# Patient Record
Sex: MALE | Race: WHITE | Employment: OTHER | URBAN - METROPOLITAN AREA
[De-identification: names, ages, dates, MRNs, and addresses within clinical notes are randomized per-mention and may not be internally consistent; named-entity substitution may affect disease eponyms.]

---

## 2018-11-08 ENCOUNTER — HOSPITAL ENCOUNTER (OUTPATIENT)
Dept: PREADMISSION TESTING | Age: 73
Discharge: HOME | End: 2018-11-08
Attending: UROLOGY
Payer: MEDICARE

## 2018-11-08 LAB
ANION GAP SERPL CALC-SCNC: 7 MMOL/L (ref 3–18)
BUN SERPL-MCNC: 22 MG/DL (ref 7–18)
BUN/CREAT SERPL: 19
CALCIUM SERPL-MCNC: 8.7 MG/DL (ref 8.5–10.1)
CHLORIDE SERPL-SCNC: 106 MMOL/L (ref 100–108)
CO2 SERPL-SCNC: 27 MMOL/L (ref 21–32)
CREAT SERPL-MCNC: 1.14 MG/DL (ref 0.6–1.3)
GLUCOSE SERPL-MCNC: 120 MG/DL (ref 74–99)
HCT VFR BLD AUTO: 42.7 % (ref 36–48)
HGB BLD-MCNC: 13.9 G/DL (ref 13–16)
POTASSIUM SERPL-SCNC: 4.2 MMOL/L (ref 3.5–5.5)
SODIUM SERPL-SCNC: 140 MMOL/L (ref 136–145)

## 2018-11-08 PROCEDURE — 36415 COLL VENOUS BLD VENIPUNCTURE: CPT

## 2018-11-08 PROCEDURE — 85018 HEMOGLOBIN: CPT

## 2018-11-08 PROCEDURE — 80048 BASIC METABOLIC PNL TOTAL CA: CPT

## 2018-11-12 PROBLEM — N21.0 BLADDER STONES: Status: ACTIVE | Noted: 2018-11-12

## 2018-11-13 ENCOUNTER — ANESTHESIA EVENT (OUTPATIENT)
Dept: SURGERY | Age: 73
End: 2018-11-13
Payer: MEDICARE

## 2018-11-13 ENCOUNTER — ANESTHESIA (OUTPATIENT)
Dept: SURGERY | Age: 73
End: 2018-11-13
Payer: MEDICARE

## 2018-11-13 ENCOUNTER — HOSPITAL ENCOUNTER (OUTPATIENT)
Age: 73
Setting detail: OUTPATIENT SURGERY
Discharge: HOME | End: 2018-11-13
Attending: UROLOGY | Admitting: UROLOGY
Payer: MEDICARE

## 2018-11-13 VITALS
SYSTOLIC BLOOD PRESSURE: 133 MMHG | BODY MASS INDEX: 33.41 KG/M2 | WEIGHT: 200.56 LBS | DIASTOLIC BLOOD PRESSURE: 81 MMHG | HEIGHT: 65 IN | OXYGEN SATURATION: 97 % | RESPIRATION RATE: 10 BRPM | TEMPERATURE: 97.7 F | HEART RATE: 74 BPM

## 2018-11-13 DIAGNOSIS — N21.0 BLADDER STONES: Primary | ICD-10-CM

## 2018-11-13 LAB — GLUCOSE BLD STRIP.AUTO-MCNC: 118 MG/DL (ref 70–110)

## 2018-11-13 PROCEDURE — 74011250636 HC RX REV CODE- 250/636

## 2018-11-13 PROCEDURE — 77030012508 HC MSK AIRWY LMA AMBU -A: Performed by: NURSE ANESTHETIST, CERTIFIED REGISTERED

## 2018-11-13 PROCEDURE — 74011250636 HC RX REV CODE- 250/636: Performed by: UROLOGY

## 2018-11-13 PROCEDURE — 82360 CALCULUS ASSAY QUANT: CPT

## 2018-11-13 PROCEDURE — 76010000138 HC OR TIME 0.5 TO 1 HR: Performed by: UROLOGY

## 2018-11-13 PROCEDURE — 76060000032 HC ANESTHESIA 0.5 TO 1 HR: Performed by: UROLOGY

## 2018-11-13 PROCEDURE — 82962 GLUCOSE BLOOD TEST: CPT

## 2018-11-13 PROCEDURE — 76210000021 HC REC RM PH II 0.5 TO 1 HR: Performed by: UROLOGY

## 2018-11-13 PROCEDURE — 77030020782 HC GWN BAIR PAWS FLX 3M -B: Performed by: UROLOGY

## 2018-11-13 PROCEDURE — C1769 GUIDE WIRE: HCPCS | Performed by: UROLOGY

## 2018-11-13 PROCEDURE — 77030032490 HC SLV COMPR SCD KNE COVD -B: Performed by: UROLOGY

## 2018-11-13 PROCEDURE — 76210000006 HC OR PH I REC 0.5 TO 1 HR: Performed by: UROLOGY

## 2018-11-13 PROCEDURE — 74011000250 HC RX REV CODE- 250

## 2018-11-13 PROCEDURE — 77030005518 HC CATH URETH FOL 2W BARD -B: Performed by: UROLOGY

## 2018-11-13 PROCEDURE — 77030013079 HC BLNKT BAIR HGGR 3M -A: Performed by: NURSE ANESTHETIST, CERTIFIED REGISTERED

## 2018-11-13 PROCEDURE — 77030018832 HC SOL IRR H20 ICUM -A: Performed by: UROLOGY

## 2018-11-13 PROCEDURE — 77030018836 HC SOL IRR NACL ICUM -A: Performed by: UROLOGY

## 2018-11-13 RX ORDER — SODIUM CHLORIDE, SODIUM LACTATE, POTASSIUM CHLORIDE, CALCIUM CHLORIDE 600; 310; 30; 20 MG/100ML; MG/100ML; MG/100ML; MG/100ML
100 INJECTION, SOLUTION INTRAVENOUS CONTINUOUS
Status: DISCONTINUED | OUTPATIENT
Start: 2018-11-13 | End: 2018-11-13 | Stop reason: HOSPADM

## 2018-11-13 RX ORDER — LIDOCAINE HYDROCHLORIDE 20 MG/ML
INJECTION, SOLUTION EPIDURAL; INFILTRATION; INTRACAUDAL; PERINEURAL AS NEEDED
Status: DISCONTINUED | OUTPATIENT
Start: 2018-11-13 | End: 2018-11-13 | Stop reason: HOSPADM

## 2018-11-13 RX ORDER — PROPOFOL 10 MG/ML
INJECTION, EMULSION INTRAVENOUS AS NEEDED
Status: DISCONTINUED | OUTPATIENT
Start: 2018-11-13 | End: 2018-11-13 | Stop reason: HOSPADM

## 2018-11-13 RX ORDER — FLUMAZENIL 0.1 MG/ML
0.2 INJECTION INTRAVENOUS
Status: DISCONTINUED | OUTPATIENT
Start: 2018-11-13 | End: 2018-11-13 | Stop reason: HOSPADM

## 2018-11-13 RX ORDER — EPHEDRINE SULFATE/0.9% NACL/PF 25 MG/5 ML
SYRINGE (ML) INTRAVENOUS AS NEEDED
Status: DISCONTINUED | OUTPATIENT
Start: 2018-11-13 | End: 2018-11-13 | Stop reason: HOSPADM

## 2018-11-13 RX ORDER — SODIUM CHLORIDE, SODIUM LACTATE, POTASSIUM CHLORIDE, CALCIUM CHLORIDE 600; 310; 30; 20 MG/100ML; MG/100ML; MG/100ML; MG/100ML
125 INJECTION, SOLUTION INTRAVENOUS CONTINUOUS
Status: DISCONTINUED | OUTPATIENT
Start: 2018-11-13 | End: 2018-11-13 | Stop reason: HOSPADM

## 2018-11-13 RX ORDER — TRAMADOL HYDROCHLORIDE 50 MG/1
50 TABLET ORAL
Qty: 20 TAB | Refills: 0 | Status: SHIPPED | OUTPATIENT
Start: 2018-11-13

## 2018-11-13 RX ORDER — DEXAMETHASONE SODIUM PHOSPHATE 4 MG/ML
INJECTION, SOLUTION INTRA-ARTICULAR; INTRALESIONAL; INTRAMUSCULAR; INTRAVENOUS; SOFT TISSUE AS NEEDED
Status: DISCONTINUED | OUTPATIENT
Start: 2018-11-13 | End: 2018-11-13 | Stop reason: HOSPADM

## 2018-11-13 RX ORDER — MIDAZOLAM HYDROCHLORIDE 1 MG/ML
INJECTION, SOLUTION INTRAMUSCULAR; INTRAVENOUS AS NEEDED
Status: DISCONTINUED | OUTPATIENT
Start: 2018-11-13 | End: 2018-11-13 | Stop reason: HOSPADM

## 2018-11-13 RX ORDER — CEFAZOLIN SODIUM/WATER 2 G/20 ML
2 SYRINGE (ML) INTRAVENOUS ONCE
Status: COMPLETED | OUTPATIENT
Start: 2018-11-13 | End: 2018-11-13

## 2018-11-13 RX ORDER — ONDANSETRON 2 MG/ML
INJECTION INTRAMUSCULAR; INTRAVENOUS AS NEEDED
Status: DISCONTINUED | OUTPATIENT
Start: 2018-11-13 | End: 2018-11-13 | Stop reason: HOSPADM

## 2018-11-13 RX ORDER — FENTANYL CITRATE 50 UG/ML
INJECTION, SOLUTION INTRAMUSCULAR; INTRAVENOUS AS NEEDED
Status: DISCONTINUED | OUTPATIENT
Start: 2018-11-13 | End: 2018-11-13 | Stop reason: HOSPADM

## 2018-11-13 RX ORDER — CEPHALEXIN 250 MG/1
500 CAPSULE ORAL 2 TIMES DAILY
Qty: 15 CAP | Refills: 0 | Status: SHIPPED | OUTPATIENT
Start: 2018-11-13 | End: 2018-11-20

## 2018-11-13 RX ORDER — NALOXONE HYDROCHLORIDE 0.4 MG/ML
0.4 INJECTION, SOLUTION INTRAMUSCULAR; INTRAVENOUS; SUBCUTANEOUS AS NEEDED
Status: DISCONTINUED | OUTPATIENT
Start: 2018-11-13 | End: 2018-11-13 | Stop reason: HOSPADM

## 2018-11-13 RX ORDER — GLYCOPYRROLATE 0.2 MG/ML
INJECTION INTRAMUSCULAR; INTRAVENOUS AS NEEDED
Status: DISCONTINUED | OUTPATIENT
Start: 2018-11-13 | End: 2018-11-13 | Stop reason: HOSPADM

## 2018-11-13 RX ORDER — FENTANYL CITRATE 50 UG/ML
50 INJECTION, SOLUTION INTRAMUSCULAR; INTRAVENOUS
Status: DISCONTINUED | OUTPATIENT
Start: 2018-11-13 | End: 2018-11-13 | Stop reason: HOSPADM

## 2018-11-13 RX ADMIN — MIDAZOLAM HYDROCHLORIDE 2 MG: 1 INJECTION, SOLUTION INTRAMUSCULAR; INTRAVENOUS at 14:18

## 2018-11-13 RX ADMIN — SODIUM CHLORIDE, SODIUM LACTATE, POTASSIUM CHLORIDE, AND CALCIUM CHLORIDE: 600; 310; 30; 20 INJECTION, SOLUTION INTRAVENOUS at 14:15

## 2018-11-13 RX ADMIN — SODIUM CHLORIDE, SODIUM LACTATE, POTASSIUM CHLORIDE, AND CALCIUM CHLORIDE: 600; 310; 30; 20 INJECTION, SOLUTION INTRAVENOUS at 14:45

## 2018-11-13 RX ADMIN — LIDOCAINE HYDROCHLORIDE 100 MG: 20 INJECTION, SOLUTION EPIDURAL; INFILTRATION; INTRACAUDAL; PERINEURAL at 14:24

## 2018-11-13 RX ADMIN — GLYCOPYRROLATE 0.2 MG: 0.2 INJECTION INTRAMUSCULAR; INTRAVENOUS at 14:18

## 2018-11-13 RX ADMIN — FENTANYL CITRATE 100 MCG: 50 INJECTION, SOLUTION INTRAMUSCULAR; INTRAVENOUS at 14:18

## 2018-11-13 RX ADMIN — SODIUM CHLORIDE, SODIUM LACTATE, POTASSIUM CHLORIDE, AND CALCIUM CHLORIDE 125 ML/HR: 600; 310; 30; 20 INJECTION, SOLUTION INTRAVENOUS at 15:19

## 2018-11-13 RX ADMIN — Medication 10 MG: at 14:51

## 2018-11-13 RX ADMIN — ONDANSETRON 4 MG: 2 INJECTION INTRAMUSCULAR; INTRAVENOUS at 14:18

## 2018-11-13 RX ADMIN — Medication 2 G: at 14:18

## 2018-11-13 RX ADMIN — Medication 10 MG: at 14:49

## 2018-11-13 RX ADMIN — DEXAMETHASONE SODIUM PHOSPHATE 4 MG: 4 INJECTION, SOLUTION INTRA-ARTICULAR; INTRALESIONAL; INTRAMUSCULAR; INTRAVENOUS; SOFT TISSUE at 14:18

## 2018-11-13 RX ADMIN — PROPOFOL 200 MG: 10 INJECTION, EMULSION INTRAVENOUS at 14:24

## 2018-11-13 NOTE — ANESTHESIA PREPROCEDURE EVALUATION
Anesthetic History Pertinent negatives: No increased risk of difficult airway, PONV, pseudocholinesterase deficiency, malignant hyperthermia and history of awareness of surgery under anesthesia Comments: Coded during heart ablation requiring ACLS Review of Systems / Medical History Patient summary reviewed, nursing notes reviewed and pertinent labs reviewed Pulmonary Sleep apnea: No treatment Pertinent negatives: No COPD, asthma and recent URI Comments: Former smoker Neuro/Psych CVA: no residual symptoms TIA Pertinent negatives: No seizures and neuromuscular disease Cardiovascular Hypertension: well controlled Dysrhythmias : atrial fibrillation Past MI and CAD Exercise tolerance: >4 METS Comments: Paroxysmal afib GI/Hepatic/Renal 
  
GERD: well controlled Renal disease: stones Pertinent negatives: No hepatitis and liver disease Endo/Other Diabetes Obesity and arthritis Pertinent negatives: No hypothyroidism, hyperthyroidism, morbid obesity and blood dyscrasia Other Findings Comments: Diet controlled DM Physical Exam 
 
Airway Mallampati: III 
TM Distance: 4 - 6 cm Neck ROM: decreased range of motion Mouth opening: Normal 
 
 Cardiovascular Regular rate and rhythm,  S1 and S2 normal,  no murmur, click, rub, or gallop Dental 
 
Dentition: Maria Guadalupe Woodward Comments: Missing many upper/lower teeth Pulmonary Breath sounds clear to auscultation Abdominal 
GI exam deferred Other Findings Anesthetic Plan ASA: 3 Anesthesia type: general 
 
 
 
 
Induction: Intravenous Anesthetic plan and risks discussed with: Patient GA/LMA

## 2018-11-13 NOTE — H&P
Urology [facility] [address]  [address] Tel: [phone] Fax: [phone] Patient: Armando Corona YOB: 1945 Assessment/Plan # Detail Type Description 1. Assessment Gross hematuria (R31.0), Symptomatic. Patient Plan His hematuria is improved following his antibiotics course but he does have bladder stones and this is likely the cause of his bleeding. 2. Assessment Urinary bladder stone (N21.0). Patient Plan He has 2 bladder stones whose total aggregate is about 2cm in size. We will do a cysto litholapaxy. Risk of bleeding, infection, injury, pain were discussed. 3. Assessment Other urethral stricture, male, meatal (N35.811). Patient Plan He had a moderate bulbar urethral stricture but I was able to pass my scope through. I will likely need to do a dilation at the time of his litholapaxy. Risk of bleeding, infection and restenosis were discussed. 4. Assessment Dysuria (R30.0). Patient Plan He had a recent UTI and this is resolved with tx.  
    
 5. Assessment Poor urinary stream (R39.12). Patient Plan This is likely due to his stricture and his bladder stones. 6. Assessment Personal history of malignant neoplasm of prostate (Z85.46). Patient Plan He's had a fine PSA recheck next visit. This 75year old male presents for Prostate Cancer, Hematuria, BPH and Erectile Dysfunction. History of Present Illness: 1. Prostate Cancer The patient is here today for scheduled follow up. The patient's status is without evidence of disease. He was diagnosed on 04/11/2013. He has had the following treatment: radiation therapy (proton beam on 08/19/2013 with outcome of no evidence of disease).  The patient is experiencing hematuria, nocturia, slow stream, urinary dribbling, urinary incontinence, urinary straining and urinary urgency but denies chills, diarrhea, a fever, headache, nausea, sexual dysfunction or vomiting. Pertinent history does not include a family history of prostate cancer or a diet high in animal fat. Additional information: PSA = 1.85 9/2010     2.24 (4/2012)   3.09 (9/2012)   4.04 (3/2013) Path 4/11/13 = positive for prostate cancer He underwent proton therapy and is doing well. Post treatment PSA = 1.29 (11/2013)  0.9 (5/2014)   0.79 (3/2015)  0.34 (2/2016)    0.28 (8/2016)    0.31 (9/2017)    0.439 (9/2018). 2.  Hematuria The patient presents with hematuria (gross). The problem began 3 years ago. The symptoms are rare. The problem has improved. Pertinent history includes being at least 35years of age but not history of UTIs or prior prostate surgeries. He also complains of incomplete emptying, nocturia, secondhand smoke, slow stream, urinary dribbling, urinary incontinence, straining to urinate and urgency. He denies chills, fever, nausea and vomiting. Additional information: He had a PVP in 2015 and has had persistent hematuria ever since. it only occurs with straining. He does take Pradaxa and this is unchanged and things are quite rare. .   
 
 9/2018 - He kal has mild gross hematuria after heavy lifting only. It is actually better than the past.  He still refuses cystoscopy 10/10/18 - He has intermittnet hematuria and now dysuria and frequency. Things are worse. 10/18/18 - He was treated for an e coli UTI and is much better. The hematuria is improved, but still happens with straining. Th e dysuria is resolved though 3. BPH Onset was gradual. Severity level is moderate. It occurs daily. The problem is with no change. Associated symptoms include hematuria (gross), incomplete emptying, nocturia (4 times per night), pelvic pressure, slow stream, urgency, urinary incontinence (urgency), dribbling and urinary straining. Pertinent negatives include chills, constipation and fever. Additional information:  He is s/p PVP in 2015 and has persistent hematuria while on Pradaxa and really only occurs after straining. He was found to have a bladder stone but he had heart issues and never had surgery. PVR=75ml (9/20/17) PVR = 0ml (9/1/16)         10/11/2018--He is doing worse with urgency, frequency, dysuria and intermittent hematuria. Frequency is Q10 min. Pain at meatus. 10/19/18 -- He is doing much better and the dysuria is beter following BacrimDS. No new urgency. still with hematuria. 4.  Erectile Dysfunction The symptoms began 4 years ago, have been severe and are unchanged. The patient is here today for a follow up visit. The patient states he does have difficultly attaining an erection and has difficulty maintaining an erection. The adequacy of his erection measures 40.00% rigidity. Pertinent history includes hypertension but not diabetes, hyperlipidemia or neurologic disease. The patient is . He denies depression. Additional information: He has persistent ED post prostate cancer treatment  He has failed oral therapies. He is not interested in new treatment at this time. PAST MEDICAL/SURGICAL HISTORY   (Reviewed, updated) Disease/disorder Onset Date Management Date Comments Green light PVP 12/02/2014 CHOLECYSTITIS  LAP CNYDY 06/18/2014 Diabetes Hypertension      
  bone spur right and left shoulder removal    
  Appendectomy PROBLEM LIST:   Problem List reviewed. Problem Description Onset Date Chronic Clinical Status Notes Syncope and collapse 02/22/2011 Y Primary malignant neoplasm of prostate 05/22/2013 Y Raised prostate specific antigen 03/18/2013 Y Tachycardia 02/22/2011 Y Benign essential hypertension 02/22/2011 Y Mixed hyperlipidemia 02/22/2011 Y Gastroesophageal reflux disease 02/22/2011 Y Arthropathy 02/22/2011 Y Impaired fasting glycaemia 02/22/2011 Y    
 Diverticular disease of colon 02/22/2011 Y Chronic obstructive lung disease 06/26/2013 Y Coronary atherosclerosis 06/26/2013 Y Atrial fibrillation 09/28/2011 Y Medications (active prior to today) Medication Instructions Start Date Stop Date Refilled Elsewhere  
aspirin 81 mg tablet,delayed release take 1 tablet (81MG)  by ORAL route  every day //  06/22/2016 Y  
losartan 50 mg tablet take 1 tablet by oral route  every day 07/05/2016 07/05/2016 N Zetia 10 mg tablet take 1 tablet (10MG)  by ORAL route  every day 07/05/2016 07/05/2016 N Vitamin D3 2,000 unit capsule once daily 09/26/2016   N  
silver sulfadiazine 1 % topical cream apply by topical route 3 times every day a 1/16 inch (1.5 mm) thick layer to entire burn area 04/10/2017   N Voltaren 1 % topical gel apply (2G)  by topical route 3 times every day to the affected area(s) 05/23/2018   N Lipitor 80 mg tablet TAKE 1 TABLET DAILY 05/31/2018 05/31/2018 N  
PRADAXA CAPS 60'S 150MG TAKE 1 CAPSULE TWICE A DAY 06/28/2018 06/28/2018 N  
PANTOPRAZOLE 40 MG TABLET,DELAYED RELEASE TAKE 1 TABLET BY ORAL ROUTE  EVERY DAY 07/25/2018 07/25/2018 N  
MULTAQ TABS 400MG TAKE 1 TABLET TWICE A DAY WITH MORNING AND EVENING MEALS 08/13/2018 08/13/2018 N  
nitroglycerin  //   Y  
prednisone 2.5 mg tablet take 1 tablet by oral route  every day 09/06/2018 09/06/2018 N  
FENOFIBRATE TABS 160MG TAKE 1 TABLET DAILY 09/18/2018 09/18/2018 N Bactrim  mg-160 mg tablet take 1 tablet by oral route  every 12 hours 10/11/2018   N Medication Reconciliation Medications reconciled today. Medication Reviewed Adherence Medication Name Sig Desc Elsewhere Status  
taking as directed aspirin 81 mg tablet,delayed release take 1 tablet (81MG)  by ORAL route  every day Y Verified  
taking as directed losartan 50 mg tablet take 1 tablet by oral route  every day N Verified  
taking as directed Zetia 10 mg tablet take 1 tablet (10MG)  by ORAL route every day N Verified  
taking as directed Vitamin D3 2,000 unit capsule once daily N Verified  
taking as directed silver sulfadiazine 1 % topical cream apply by topical route 3 times every day a 1/16 inch (1.5 mm) thick layer to entire burn area N Verified  
taking as directed Voltaren 1 % topical gel apply (2G)  by topical route 3 times every day to the affected area(s) N Verified  
taking as directed Lipitor 80 mg tablet TAKE 1 TABLET DAILY N Verified  
taking as directed PRADAXA CAPS 60'S 150MG TAKE 1 CAPSULE TWICE A DAY N Verified  
taking as directed PANTOPRAZOLE 40 MG TABLET,DELAYED RELEASE TAKE 1 TABLET BY ORAL ROUTE  EVERY DAY N Verified  
taking as directed MULTAQ TABS 400MG TAKE 1 TABLET TWICE A DAY WITH MORNING AND EVENING MEALS N Verified  
taking as directed nitroglycerin  Y Verified  
taking as directed prednisone 2.5 mg tablet take 1 tablet by oral route  every day N Verified  
taking as directed FENOFIBRATE TABS 160MG TAKE 1 TABLET DAILY N Verified  
taking as directed Bactrim  mg-160 mg tablet take 1 tablet by oral route  every 12 hours N Verified Allergies Ingredient Reaction (Severity) Medication Name Comment NO KNOWN ALLERGIES Reviewed, no changes. Family History  (Reviewed, updated) Relationship Family Member Name  Age at Death Condition Onset Age Cause of Death Family history of Heart disease  N Family history of heart attacks  N Family history of Seizure disorder  N Family history of Diabetes mellitus  N Family history of Bleeding tendencies  N Family history of Hypertension  N Brother      N Brother  [employer] and well  Jennifer Hastings Brother  N  Cancer, prostate 78 N Father      N Mother      N Sister  [employer] and well  N Sister      N Social History:  (Reviewed, updated) Tobacco use reviewed. Preferred language is [place].    
MARITAL STATUS/FAMILY/SOCIAL SUPPORT 
 Currently . Smoking status: Never smoker. SMOKING STATUS Use Status Type Smoking Status Usage Per Day Years Used Total Pack Years  
no/never  Never smoker TOBACCO/VAPING EXPOSURE There is passive smoke exposure. ALCOHOL There is a history of alcohol use. Type: Hard liquor. 1 1/2 shots consumed daily. CAFFEINE The patient uses caffeine: coffee - 5 cups a day. LIFESTYLE 
Moderate activity level. Exercises 2-3 times a week. Review of Systems System Neg/Pos Details Constitutional Negative Chills and Fever. ENMT Negative Ear infections and Sore throat. Eyes Negative Blurred vision, Double vision and Eye pain. Respiratory Negative Asthma, Chronic cough, Dyspnea and Wheezing. Cardio Negative Chest pain. GI Negative Constipation, Decreased appetite, Diarrhea, Nausea and Vomiting.  Positive Hematuria, Incomplete emptying, Nocturia, Pelvic pressure, Slow stream, Urgency, Urinary dribbling, Urinary incontinence, Urinary straining. Endocrine Negative Cold intolerance, Heat intolerance, Increased thirst and Weight loss. Neuro Negative Headache and Tremors. Psych Negative Anxiety and Depression. Integumentary Negative Itching skin and Rash. MS Negative Back pain and Joint pain. Hema/Lymph Negative Easy bleeding. Reproductive Negative Sexual dysfunction. Physical Exam 
Exam Findings Details Constitutional Normal Well developed. Neck Exam Normal Inspection - Normal.  
Respiratory Normal Inspection - Normal.  
Abdomen Normal No abdominal tenderness. Genitourinary Normal Penis - Normal. Urethral meatus - Normal. Scrotum - Normal. Epididymides - Normal. Lymph nodes - Normal. Testes - Normal. No CVA tenderness. No suprapubic tenderness. Extremity Normal No edema. Psychiatric Normal Orientation - Oriented to time, place, person & situation. Appropriate mood and affect. Procedures: 
 
Cystoscopy indication: 
Bladder. Patient consent: Consent was obtained. The procedure and risks were explained in detail. Questions were encouraged and answered. The patient was prepped and draped in the usual sterile fashion. Procedure: A diagnostic cystourethroscopy was performed using a 16 Bengali flexible cystoscope Anesthesia: 
Lidocaine Jelly 2% Patient position: 
Supine. Patient response: 
Patient tolerated procedure well. Patient was given instructions. Patient was discharged in stable condition. Findings: Anterior urethra normal in appearance. Prostatic urethra post TURP. The bladder has a stone that is 20mm, there are multiple stones. Ureteral orifices normal in appearance. Antibiotics: Antibiotics given:  Cipro Impression: 
Gross hematuria R31.0. Patient Education # Patient Education 1. Cystoscopy: Care Instructions Medications (added, continued, or stopped today) Start Date Medication Directions PRN Status PRN Reason Instruction Stop Date  
 aspirin 81 mg tablet,delayed release take 1 tablet (81MG)  by ORAL route  every day N     
10/11/2018 Bactrim  mg-160 mg tablet take 1 tablet by oral route  every 12 hours N     
09/18/2018 FENOFIBRATE TABS 160MG TAKE 1 TABLET DAILY N     
05/31/2018 Lipitor 80 mg tablet TAKE 1 TABLET DAILY N     
07/05/2016 losartan 50 mg tablet take 1 tablet by oral route  every day N     
08/13/2018 MULTAQ TABS 400MG TAKE 1 TABLET TWICE A DAY WITH MORNING AND EVENING MEALS N     
 nitroglycerin  N     
07/25/2018 PANTOPRAZOLE 40 MG TABLET,DELAYED RELEASE TAKE 1 TABLET BY ORAL ROUTE  EVERY DAY N     
06/28/2018 PRADAXA CAPS 60'S 150MG TAKE 1 CAPSULE TWICE A DAY N     
09/06/2018 prednisone 2.5 mg tablet take 1 tablet by oral route  every day N  start after steroid taper 04/10/2017 silver sulfadiazine 1 % topical cream apply by topical route 3 times every day a 1/16 inch (1.5 mm) thick layer to entire burn area N     
09/26/2016 Vitamin D3 2,000 unit capsule once daily N     
 05/23/2018 Voltaren 1 % topical gel apply (2G)  by topical route 3 times every day to the affected area(s) N     
07/05/2016 Zetia 10 mg tablet take 1 tablet (10MG)  by ORAL route  every day N Active Patient Care Team Members Name Contact Agency Type Support Role Relationship Active Date Inactive Date Specialty Jeimy Earl   Patient provider PCP   Family Practice Jenae Núñez   specialist      
Les Cerda   encounter provider    Urology Provider:  
 
 
Rigoberto Thomson MD

## 2018-11-13 NOTE — BRIEF OP NOTE
BRIEF OPERATIVE NOTE Date of Procedure: 11/13/2018 Preoperative Diagnosis: BLADDER STONES Postoperative Diagnosis: BLADDER STONES, POST-OPERATIVE URETHRAL STRICTURE Procedure(s): 
CYSTOSCOPY, CYSTOLITHALOPAXY- SMALL, URETHRAL DILATION Surgeon(s) and Role: 
   * Mandeep Reece MD - Primary Surgical Assistant: NONE Surgical Staff: 
Circ-1: Reba Becerra RN Scrub Tech-1: Dayna Moore Scrub RN-1: Cleo Jimenez Event Time In Time Out Incision Start 21  Incision Close 1506 Anesthesia: General  
Estimated Blood Loss: MINIMAL Specimens:  
ID Type Source Tests Collected by Time Destination 1 : bladder stones Preservative Bladder  Mandeep Reece MD 11/13/2018 1456 Pathology Findings: TIGHT AND DENSE BULBAR URETHRAL STRICTURE,  2 CM BLADDER STONE Complications: NONE Implants: 22 fR [place] TIP LÓPEZ

## 2018-11-13 NOTE — OP NOTES
[facility] [place]  OPERATIVE REPORT    Radha Mullen  MR#: [id number]  : 1945  ACCOUNT #: [de-identified]   DATE OF SERVICE: 2018    PREOPERATIVE DIAGNOSIS:  Bladder stones. POSTOPERATIVE DIAGNOSIS:  Bladder stones and postoperative urethral stricture. PROCEDURES PERFORMED:  Cystoscopy, cystolitholapaxy (small, less than 2.5 cm), and a urethral dilation. SURGEON:  Yue Swann MD    ASSISTANT:  None. ANESTHESIA:  General.    ESTIMATED BLOOD LOSS:  Minimal.    SPECIMENS REMOVED:  Bladder stone. FINDINGS:  The patient had tight dense bulbar urethral stricture. There was a 2 cm bladder stone. COMPLICATIONS:  None. IMPLANT:  A 22-[place]orean [place]. CLINICAL NOTE:  The patient is a 50-year-old gentleman with a history of prostate cancer who underwent ablative therapy and he had a lot of urinary issues for which he was treated with a TURP. He has had declining urinary symptoms with slowing of his stream, burning, difficulties voiding and gross hematuria. He was found to have a bladder stone. He presents for treatment. OPERATIVE REPORT:  Preoperatively, risks and benefits of surgery were described to the patient. The risks include but are not limited to bleeding, infection, injury to bladder, injury to the urethra, possible need for future procedures. The patient understood the risks and signed the informed consent. The patient was taken to the operating room and placed on OR table in supine position. He was administered general anesthetic. He was administered intravenous antibiotics. He was placed in dorsal lithotomy position, prepped and draped in the usual sterile manner. A 21-Korean cystoscope and sheath was then inserted transurethrally atraumatically under direct vision using a 30 degree lens. Once I got all the way down to the bulbar urethra, I encountered a very dense and blanched stricture.   I was able to feed a sensor wire through the stricture and into his bladder. I removed the scope. Over the Sensor wire, I passed Shanice sounds dilating him up from 12 [place] up to 30-Malagasy sequentially. The last dilator was removed and I passed the cystoscope transurethrally into the prostatic urethra and into the bladder. In the prostatic urethra. I encountered some stone that was 2 cm in size. The panendoscopy was done of the bladder. There were no stones, no tumors, no areas of concern in the bladder, it was a stone adherent to the wall of the prostatic urethra. Using a 500 mcg holmium laser, I broke up the stone into many small fragments until it was completely disconnected from prostatic wall. At this point, the stone fragments were irrigated out of the bladder just with passive filling and emptying through the cystoscope. The scope was then reinserted back through the sheath and the bladder was inspected and unharmed and normal.  Bilateral ureteral orifices were inspected and unharmed and normal and the prostatic urethra had minimal bleeding. There was no additional stone fragments identified. At this point, the scope was removed after passing a sensor wire through the scope into the bladder. The bladder was then emptied with a 22-Malagasy [place] tip catheter placed over the wire. The balloon was inflated and the patient was then taken out of lithotomy position and taken to recovery in stable condition.       Love Frost MD       [facility]  D: 11/13/2018 15:33     T: 11/13/2018 18:20  JOB #: [id number]

## 2018-11-13 NOTE — DISCHARGE INSTRUCTIONS
A cystoscopy is a procedure that lets a doctor look inside of the bladder and the urethra. The urethra is the tube that carries urine from the bladder to outside the body. The doctor uses a thin, lighted tool called a cystoscope. Your bladder is filled with fluid. This stretches the bladder so that your doctor can look closely at the inside of your bladder. After the cystoscopy, your urethra may be sore at first, and it may burn when you urinate for the first few days after the procedure. You may feel the need to urinate more often, and your urine may be pink. These symptoms should get better in 1 or 2 days. You will probably be able to go back to most of your usual activities in 1 or 2 days. This care sheet gives you a general idea about how long it will take for you to recover. But each person recovers at a different pace. Follow the steps below to get better as quickly as possible. How can you care for yourself at home? Activity    · Rest when you feel tired. Getting enough sleep will help you recover.     · Try to walk each day. Start by walking a little more than you did the day before. Bit by bit, increase the amount you walk. Walking boosts blood flow and helps prevent pneumonia and constipation.     · Avoid strenuous activities, such as bicycle riding, jogging, weight lifting, or aerobic exercise, until your doctor says it is okay.     · Ask your doctor when you can drive again.     · Most people are able to return to work within 1 or 2 days after the procedure.     · You may shower and take baths as usual.     · Ask your doctor when it is okay for you to have sex. Diet    · You can eat your normal diet. If your stomach is upset, try bland, low-fat foods like plain rice, broiled chicken, toast, and yogurt.     · Drink plenty of fluids (unless your doctor tells you not to). Medicines    · Take pain medicines exactly as directed.   ? If the doctor gave you a prescription medicine for pain, take it as prescribed. ? If you are not taking a prescription pain medicine, ask your doctor if you can take an over-the-counter medicine.     · If you think your pain medicine is making you sick to your stomach:  ? Take your medicine after meals (unless your doctor has told you not to). ? Ask your doctor for a different pain medicine.     · If your doctor prescribed antibiotics, take them as directed. Do not stop taking them just because you feel better. You need to take the full course of antibiotics. Follow-up care is a key part of your treatment and safety. Be sure to make and go to all appointments, and call your doctor if you are having problems. It's also a good idea to know your test results and keep a list of the medicines you take. When should you call for help? Call 911 anytime you think you may need emergency care. For example, call if:    · You passed out (lost consciousness).     · You have severe trouble breathing.     · You have sudden chest pain and shortness of breath, or you cough up blood.     · You have severe belly pain.    Call your doctor now or seek immediate medical care if:    · You are sick to your stomach or cannot keep fluids down.     · Your urine is still red or you see blood clots after you have urinated several times.     · You have trouble passing urine or stool, especially if you have pain or swelling in your lower belly.     · You have signs of a blood clot, such as:  ? Pain in your calf, back of the knee, thigh, or groin. ? Redness and swelling in your leg or groin.     · You develop a fever or severe chills.     · You have pain in your back just below your rib cage. This is called flank pain.    Watch closely for changes in your health, and be sure to contact your doctor if:    · You have pain or burning when you urinate.  A burning feeling is normal for a day or two after the test, but call if it does not get better.     · You have a frequent urge to urinate but can pass only small amounts of urine.     · Your urine is pink, red, or cloudy, or smells bad. It is normal for the urine to have a pinkish color for a few days after the test, but call if it does not get better. Where can you learn more? Go to http://ysabel-justa.info/. Enter Y423 in the search box to learn more about \"Cystoscopy: What to Expect at Home. \"  Current as of: March 21, 2018  Content Version: 11.8  © 4242-2066 [employer]. Care instructions adapted under license by [employer] (which disclaims liability or warranty for this information). If you have questions about a medical condition or this instruction, always ask your healthcare professional. [address] any warranty or liability for your use of this information. Patient armband removed and shredded    DISCHARGE SUMMARY from Nurse    PATIENT INSTRUCTIONS:    After general anesthesia or intravenous sedation, for 24 hours or while taking prescription Narcotics:  · Limit your activities  · Do not drive and operate hazardous machinery  · Do not make important personal or business decisions  · Do  not drink alcoholic beverages  · If you have not urinated within 8 hours after discharge, please contact your surgeon on call. Report the following to your surgeon:  · Excessive pain, swelling, redness or odor of or around the surgical area  · Temperature over 100.5  · Nausea and vomiting lasting longer than 4 hours or if unable to take medications  · Any signs of decreased circulation or nerve impairment to extremity: change in color, persistent  numbness, tingling, coldness or increase pain  · Any questions    What to do at Home:  Recommended activity: Activity as tolerated and no driving for today and Ambulate in house,     If you experience any of the following symptoms above, please follow up with Dr. Bear Barrera. *  Please give a list of your current medications to your Primary Care Provider.     * Please update this list whenever your medications are discontinued, doses are      changed, or new medications (including over-the-counter products) are added. *  Please carry medication information at all times in case of emergency situations. These are general instructions for a healthy lifestyle:    No smoking/ No tobacco products/ Avoid exposure to second hand smoke  Surgeon General's Warning:  Quitting smoking now greatly reduces serious risk to your health. Obesity, smoking, and sedentary lifestyle greatly increases your risk for illness    A healthy diet, regular physical exercise & weight monitoring are important for maintaining a healthy lifestyle    You may be retaining fluid if you have a history of heart failure or if you experience any of the following symptoms:  Weight gain of 3 pounds or more overnight or 5 pounds in a week, increased swelling in our hands or feet or shortness of breath while lying flat in bed. Please call your doctor as soon as you notice any of these symptoms; do not wait until your next office visit. Recognize signs and symptoms of STROKE:    F-face looks uneven    A-arms unable to move or move unevenly    S-speech slurred or non-existent    T-time-call 911 as soon as signs and symptoms begin-DO NOT go       Back to bed or wait to see if you get better-TIME IS BRAIN. Warning Signs of HEART ATTACK     Call 911 if you have these symptoms:   Chest discomfort. Most heart attacks involve discomfort in the center of the chest that lasts more than a few minutes, or that goes away and comes back. It can feel like uncomfortable pressure, squeezing, fullness, or pain.  Discomfort in other areas of the upper body. Symptoms can include pain or discomfort in one or both arms, the back, neck, jaw, or stomach.  Shortness of breath with or without chest discomfort.  Other signs may include breaking out in a cold sweat, nausea, or lightheadedness.   Don't wait more than five minutes to call 911 - MINUTES MATTER! Fast action can save your life. Calling 911 is almost always the fastest way to get lifesaving treatment. Emergency Medical Services staff can begin treatment when they arrive -- up to an hour sooner than if someone gets to the hospital by car. The discharge information has been reviewed with the patient and caregiver. The patient and caregiver verbalized understanding. Discharge medications reviewed with the patient and caregiver and appropriate educational materials and side effects teaching were provided.   ___________________________________________________________________________________________________________________________________

## 2018-11-13 NOTE — ANESTHESIA POSTPROCEDURE EVALUATION
Post-Anesthesia Evaluation & Assessment Visit Vitals /69 Pulse 74 Temp 36.5 °C (97.7 °F) Resp 13 Ht 5' 5\" (1.651 m) Wt 91 kg (200 lb 9 oz) SpO2 100% BMI 33.38 kg/m² Nausea/Vomiting: Controlled. Post-operative hydration adequate. Pain Scale 1: Numeric (0 - 10) (11/13/18 1550) Pain Intensity 1: 0 (11/13/18 1550) Managed Pain score at or below stated goal level. Mental status & Level of consciousness: alert and oriented x 3 Neurological status: moves all extremities, sensation grossly intact Pulmonary status: airway patent, adequate oxygenation. Complications related to anesthesia: none Patient has met all PACU discharge requirements.  
 
 
Ella Edgar,

## 2018-11-13 NOTE — INTERVAL H&P NOTE
H&P Update: 
Augustin Rojas was seen and examined. History and physical has been reviewed. The patient has been examined.  There have been no significant clinical changes since the completion of the originally dated History and Physical. 
 
Signed By: Cristel Newman MD   
 November 13, 2018 2:12 PM

## 2018-11-13 NOTE — PERIOP NOTES
Spoke with Dr. Latrice Avalos regarding patient's last dose of ASA and Pradaxa being 11/10. Dr. Pollard Silva ok to proceed.

## 2018-11-23 LAB
CA PHOS MFR STONE: 15 %
CALCIUM OXALATE DIHYDRATE MFR STONE IR: 5 %
COLOR STONE: NORMAL
COM MFR STONE: 80 %
COMMENT, 519994: NORMAL
COMPOSITION, 120440: NORMAL
DISCLAIMER, STO32L: NORMAL
NIDUS STONE QL: NORMAL
SIZE STONE: NORMAL MM
WT STONE: 115.6 MG

## (undated) DEVICE — CYSTO PACK: Brand: MEDLINE INDUSTRIES, INC.

## (undated) DEVICE — CATHETER URETH 22FR BLLN 5CC STD LTX 2 W TWO OPP DRNGE EYE

## (undated) DEVICE — TRAP MUCUS SPECIMEN 40ML -- MEDICHOICE

## (undated) DEVICE — SKIN MARKER,REGULAR TIP WITH RULER AND LABELS: Brand: DEVON

## (undated) DEVICE — SOLUTION IRRIG 3000ML 0.9% SOD CHL FLX CONT 0797208] ICU MEDICAL INC]

## (undated) DEVICE — STERILE LATEX POWDER-FREE SURGICAL GLOVESWITH NITRILE COATING: Brand: PROTEXIS

## (undated) DEVICE — KENDALL SCD EXPRESS SLEEVES, KNEE LENGTH, MEDIUM: Brand: KENDALL SCD

## (undated) DEVICE — SOLUTION IRRIGATION H2O 0797305] ICU MEDICAL INC]

## (undated) DEVICE — DEVON™ KNEE AND BODY STRAP 60" X 3" (1.5 M X 7.6 CM): Brand: DEVON

## (undated) DEVICE — BAG URIN LEG DISPOZ-A-BG 19OZ -- W/18IN EXT TUBING

## (undated) DEVICE — MEDI-VAC NON-CONDUCTIVE SUCTION TUBING: Brand: CARDINAL HEALTH

## (undated) DEVICE — GDWIRE 3CM FLX-TIP 0.038X150CM -- BX/5 SENSOR